# Patient Record
Sex: MALE | Race: WHITE | Employment: OTHER | URBAN - METROPOLITAN AREA
[De-identification: names, ages, dates, MRNs, and addresses within clinical notes are randomized per-mention and may not be internally consistent; named-entity substitution may affect disease eponyms.]

---

## 2017-03-26 ENCOUNTER — HOSPITAL ENCOUNTER (OUTPATIENT)
Dept: MRI IMAGING | Facility: HOSPITAL | Age: 77
Discharge: HOME | End: 2017-03-26
Attending: INTERNAL MEDICINE
Payer: MEDICARE

## 2017-03-26 DIAGNOSIS — M54.50 LUMBAR PAIN: ICD-10-CM

## 2017-03-26 PROCEDURE — 72148 MRI LUMBAR SPINE W/O DYE: CPT

## 2020-02-24 ENCOUNTER — LABORATORY ENCOUNTER (OUTPATIENT)
Dept: LAB | Facility: HOSPITAL | Age: 80
End: 2020-02-24
Attending: ORTHOPAEDIC SURGERY
Payer: MEDICARE

## 2020-02-24 ENCOUNTER — HOSPITAL ENCOUNTER (OUTPATIENT)
Dept: PHYSICAL THERAPY | Facility: HOSPITAL | Age: 80
Discharge: HOME | End: 2020-02-24
Attending: ORTHOPAEDIC SURGERY
Payer: MEDICARE

## 2020-02-24 DIAGNOSIS — M16.11 PRIMARY OSTEOARTHRITIS OF RIGHT HIP: ICD-10-CM

## 2020-02-24 LAB
ANTIBODY SCREEN: NEGATIVE
RH BLOOD TYPE: POSITIVE

## 2020-02-24 PROCEDURE — 86900 BLOOD TYPING SEROLOGIC ABO: CPT

## 2020-02-24 PROCEDURE — 86850 RBC ANTIBODY SCREEN: CPT

## 2020-02-24 PROCEDURE — 87081 CULTURE SCREEN ONLY: CPT

## 2020-02-24 PROCEDURE — 86901 BLOOD TYPING SEROLOGIC RH(D): CPT

## 2020-03-10 ENCOUNTER — ANESTHESIA EVENT (OUTPATIENT)
Dept: SURGERY | Facility: HOSPITAL | Age: 80
DRG: 470 | End: 2020-03-10
Payer: MEDICARE

## 2020-03-10 NOTE — H&P
Amrita Morelos Patient Status:  Surgery Admit - Inpt    1940 MRN [id number]   [facility] SURGERY Attending Berhane Jorgensen MD   Hosp Day # 0 PCP Joanna Kirkpatrick MD     Date of Admission List:     Congenital spondylolisthesis     Spinal stenosis, lumbar region, without neurogenic claudication     Osteoarthritis of left hip     At risk for falling     Primary osteoarthritis of left hip             GLOBAL EXP 3-9-15 / LEFT HIP / Ese Slade / [place]

## 2020-03-11 ENCOUNTER — HOSPITAL ENCOUNTER (INPATIENT)
Facility: HOSPITAL | Age: 80
LOS: 1 days | Discharge: HOME HEALTH | DRG: 470 | End: 2020-03-12
Attending: ORTHOPAEDIC SURGERY | Admitting: ORTHOPAEDIC SURGERY
Payer: MEDICARE

## 2020-03-11 ENCOUNTER — APPOINTMENT (OUTPATIENT)
Dept: GENERAL RADIOLOGY | Facility: HOSPITAL | Age: 80
DRG: 470 | End: 2020-03-11
Attending: ORTHOPAEDIC SURGERY
Payer: MEDICARE

## 2020-03-11 ENCOUNTER — ANESTHESIA (OUTPATIENT)
Dept: SURGERY | Facility: HOSPITAL | Age: 80
DRG: 470 | End: 2020-03-11
Payer: MEDICARE

## 2020-03-11 DIAGNOSIS — M16.11 PRIMARY OSTEOARTHRITIS OF RIGHT HIP: Primary | ICD-10-CM

## 2020-03-11 PROCEDURE — 3E0T3BZ INTRODUCTION OF ANESTHETIC AGENT INTO PERIPHERAL NERVES AND PLEXI, PERCUTANEOUS APPROACH: ICD-10-PCS | Performed by: ANESTHESIOLOGY

## 2020-03-11 PROCEDURE — 88342 IMHCHEM/IMCYTCHM 1ST ANTB: CPT | Performed by: ORTHOPAEDIC SURGERY

## 2020-03-11 PROCEDURE — 73501 X-RAY EXAM HIP UNI 1 VIEW: CPT | Performed by: ORTHOPAEDIC SURGERY

## 2020-03-11 PROCEDURE — 88311 DECALCIFY TISSUE: CPT | Performed by: ORTHOPAEDIC SURGERY

## 2020-03-11 PROCEDURE — 97116 GAIT TRAINING THERAPY: CPT

## 2020-03-11 PROCEDURE — 0SR90JA REPLACEMENT OF RIGHT HIP JOINT WITH SYNTHETIC SUBSTITUTE, UNCEMENTED, OPEN APPROACH: ICD-10-PCS | Performed by: ORTHOPAEDIC SURGERY

## 2020-03-11 PROCEDURE — 88341 IMHCHEM/IMCYTCHM EA ADD ANTB: CPT | Performed by: ORTHOPAEDIC SURGERY

## 2020-03-11 PROCEDURE — 97161 PT EVAL LOW COMPLEX 20 MIN: CPT

## 2020-03-11 PROCEDURE — 88304 TISSUE EXAM BY PATHOLOGIST: CPT | Performed by: ORTHOPAEDIC SURGERY

## 2020-03-11 DEVICE — CORAIL HIP SYSTEM CEMENTLESS FEMORAL STEM 12/14 AMT 135 DEGREES KS SIZE 11 HA COATED STANDARD NO COLLAR
Type: IMPLANTABLE DEVICE | Site: HIP | Status: FUNCTIONAL
Brand: CORAIL

## 2020-03-11 DEVICE — PINNACLE CANCELLOUS BONE SCREW 6.5MM X 25MM
Type: IMPLANTABLE DEVICE | Site: HIP | Status: FUNCTIONAL
Brand: PINNACLE

## 2020-03-11 DEVICE — PINNACLE GRIPTION ACETABULAR SHELL SECTOR 58MM OD
Type: IMPLANTABLE DEVICE | Site: HIP | Status: FUNCTIONAL
Brand: PINNACLE GRIPTION

## 2020-03-11 DEVICE — PINNACLE HIP SOLUTIONS ALTRX POLYETHYLENE ACETABULAR LINER NEUTRAL 36MM ID 58MM OD
Type: IMPLANTABLE DEVICE | Site: HIP | Status: FUNCTIONAL
Brand: PINNACLE ALTRX

## 2020-03-11 DEVICE — BIOLOX DELTA CERAMIC FEMORAL HEAD +1.5 36MM DIA 12/14 TAPER
Type: IMPLANTABLE DEVICE | Site: HIP | Status: FUNCTIONAL
Brand: BIOLOX DELTA

## 2020-03-11 DEVICE — APEX HOLE ELIMINATOR - PS
Type: IMPLANTABLE DEVICE | Site: HIP | Status: FUNCTIONAL
Brand: APEX

## 2020-03-11 RX ORDER — DIPHENHYDRAMINE HYDROCHLORIDE 50 MG/ML
12.5 INJECTION INTRAMUSCULAR; INTRAVENOUS EVERY 4 HOURS PRN
Status: DISCONTINUED | OUTPATIENT
Start: 2020-03-11 | End: 2020-03-12

## 2020-03-11 RX ORDER — ONDANSETRON 2 MG/ML
4 INJECTION INTRAMUSCULAR; INTRAVENOUS EVERY 4 HOURS PRN
Status: DISCONTINUED | OUTPATIENT
Start: 2020-03-11 | End: 2020-03-12

## 2020-03-11 RX ORDER — KETAMINE HYDROCHLORIDE 50 MG/ML
INJECTION, SOLUTION, CONCENTRATE INTRAMUSCULAR; INTRAVENOUS AS NEEDED
Status: DISCONTINUED | OUTPATIENT
Start: 2020-03-11 | End: 2020-03-11 | Stop reason: SURG

## 2020-03-11 RX ORDER — DOCUSATE SODIUM 100 MG/1
100 CAPSULE, LIQUID FILLED ORAL 2 TIMES DAILY
Status: DISCONTINUED | OUTPATIENT
Start: 2020-03-11 | End: 2020-03-12

## 2020-03-11 RX ORDER — ZOLPIDEM TARTRATE 5 MG/1
5 TABLET ORAL NIGHTLY PRN
Status: DISCONTINUED | OUTPATIENT
Start: 2020-03-11 | End: 2020-03-12

## 2020-03-11 RX ORDER — METOCLOPRAMIDE HYDROCHLORIDE 5 MG/ML
10 INJECTION INTRAMUSCULAR; INTRAVENOUS EVERY 6 HOURS PRN
Status: DISCONTINUED | OUTPATIENT
Start: 2020-03-11 | End: 2020-03-12

## 2020-03-11 RX ORDER — TAMSULOSIN HYDROCHLORIDE 0.4 MG/1
0.4 CAPSULE ORAL DAILY
COMMUNITY

## 2020-03-11 RX ORDER — ACETAMINOPHEN 325 MG/1
TABLET ORAL
Status: COMPLETED
Start: 2020-03-11 | End: 2020-03-11

## 2020-03-11 RX ORDER — PROCHLORPERAZINE EDISYLATE 5 MG/ML
10 INJECTION INTRAMUSCULAR; INTRAVENOUS EVERY 6 HOURS PRN
Status: DISCONTINUED | OUTPATIENT
Start: 2020-03-11 | End: 2020-03-12

## 2020-03-11 RX ORDER — BISACODYL 10 MG
10 SUPPOSITORY, RECTAL RECTAL
Status: DISCONTINUED | OUTPATIENT
Start: 2020-03-11 | End: 2020-03-12

## 2020-03-11 RX ORDER — CEFAZOLIN SODIUM/WATER 2 G/20 ML
2 SYRINGE (ML) INTRAVENOUS ONCE
Status: COMPLETED | OUTPATIENT
Start: 2020-03-11 | End: 2020-03-11

## 2020-03-11 RX ORDER — ACETAMINOPHEN 325 MG/1
650 TABLET ORAL 4 TIMES DAILY
Status: DISCONTINUED | OUTPATIENT
Start: 2020-03-11 | End: 2020-03-12

## 2020-03-11 RX ORDER — SODIUM CHLORIDE, SODIUM LACTATE, POTASSIUM CHLORIDE, CALCIUM CHLORIDE 600; 310; 30; 20 MG/100ML; MG/100ML; MG/100ML; MG/100ML
INJECTION, SOLUTION INTRAVENOUS CONTINUOUS
Status: DISCONTINUED | OUTPATIENT
Start: 2020-03-11 | End: 2020-03-12

## 2020-03-11 RX ORDER — ONDANSETRON 2 MG/ML
4 INJECTION INTRAMUSCULAR; INTRAVENOUS AS NEEDED
Status: DISCONTINUED | OUTPATIENT
Start: 2020-03-11 | End: 2020-03-11 | Stop reason: HOSPADM

## 2020-03-11 RX ORDER — SENNOSIDES 8.6 MG
17.2 TABLET ORAL NIGHTLY
Status: DISCONTINUED | OUTPATIENT
Start: 2020-03-11 | End: 2020-03-12

## 2020-03-11 RX ORDER — HYDROMORPHONE HYDROCHLORIDE 1 MG/ML
0.8 INJECTION, SOLUTION INTRAMUSCULAR; INTRAVENOUS; SUBCUTANEOUS EVERY 2 HOUR PRN
Status: DISCONTINUED | OUTPATIENT
Start: 2020-03-11 | End: 2020-03-12

## 2020-03-11 RX ORDER — CEFAZOLIN SODIUM/WATER 2 G/20 ML
2 SYRINGE (ML) INTRAVENOUS EVERY 8 HOURS
Status: COMPLETED | OUTPATIENT
Start: 2020-03-11 | End: 2020-03-11

## 2020-03-11 RX ORDER — TIZANIDINE 2 MG/1
2 TABLET ORAL 3 TIMES DAILY PRN
Status: DISCONTINUED | OUTPATIENT
Start: 2020-03-11 | End: 2020-03-12

## 2020-03-11 RX ORDER — OXYCODONE HYDROCHLORIDE 10 MG/1
10 TABLET ORAL EVERY 4 HOURS PRN
Status: DISCONTINUED | OUTPATIENT
Start: 2020-03-11 | End: 2020-03-12

## 2020-03-11 RX ORDER — OXYCODONE HYDROCHLORIDE 5 MG/1
5 TABLET ORAL EVERY 4 HOURS PRN
Status: DISCONTINUED | OUTPATIENT
Start: 2020-03-11 | End: 2020-03-12

## 2020-03-11 RX ORDER — SODIUM PHOSPHATE, DIBASIC AND SODIUM PHOSPHATE, MONOBASIC 7; 19 G/133ML; G/133ML
1 ENEMA RECTAL ONCE AS NEEDED
Status: DISCONTINUED | OUTPATIENT
Start: 2020-03-11 | End: 2020-03-12

## 2020-03-11 RX ORDER — DIPHENHYDRAMINE HCL 25 MG
25 CAPSULE ORAL EVERY 4 HOURS PRN
Status: DISCONTINUED | OUTPATIENT
Start: 2020-03-11 | End: 2020-03-12

## 2020-03-11 RX ORDER — TAMSULOSIN HYDROCHLORIDE 0.4 MG/1
0.4 CAPSULE ORAL DAILY
Status: DISCONTINUED | OUTPATIENT
Start: 2020-03-12 | End: 2020-03-12

## 2020-03-11 RX ORDER — KETOROLAC TROMETHAMINE 15 MG/ML
15 INJECTION, SOLUTION INTRAMUSCULAR; INTRAVENOUS EVERY 6 HOURS
Status: COMPLETED | OUTPATIENT
Start: 2020-03-11 | End: 2020-03-12

## 2020-03-11 RX ORDER — CEFAZOLIN SODIUM/WATER 2 G/20 ML
SYRINGE (ML) INTRAVENOUS
Status: DISPENSED
Start: 2020-03-11 | End: 2020-03-11

## 2020-03-11 RX ORDER — ACETAMINOPHEN 500 MG
1000 TABLET ORAL ONCE
Status: DISCONTINUED | OUTPATIENT
Start: 2020-03-11 | End: 2020-03-11

## 2020-03-11 RX ORDER — MIDAZOLAM HYDROCHLORIDE 1 MG/ML
INJECTION INTRAMUSCULAR; INTRAVENOUS AS NEEDED
Status: DISCONTINUED | OUTPATIENT
Start: 2020-03-11 | End: 2020-03-11 | Stop reason: SURG

## 2020-03-11 RX ORDER — HYDROMORPHONE HYDROCHLORIDE 1 MG/ML
0.4 INJECTION, SOLUTION INTRAMUSCULAR; INTRAVENOUS; SUBCUTANEOUS EVERY 5 MIN PRN
Status: DISCONTINUED | OUTPATIENT
Start: 2020-03-11 | End: 2020-03-11 | Stop reason: HOSPADM

## 2020-03-11 RX ORDER — METOCLOPRAMIDE HYDROCHLORIDE 5 MG/ML
10 INJECTION INTRAMUSCULAR; INTRAVENOUS AS NEEDED
Status: DISCONTINUED | OUTPATIENT
Start: 2020-03-11 | End: 2020-03-11 | Stop reason: HOSPADM

## 2020-03-11 RX ORDER — ACETAMINOPHEN 500 MG
500 TABLET ORAL ONCE AS NEEDED
Status: DISCONTINUED | OUTPATIENT
Start: 2020-03-11 | End: 2020-03-11 | Stop reason: HOSPADM

## 2020-03-11 RX ORDER — NALOXONE HYDROCHLORIDE 0.4 MG/ML
80 INJECTION, SOLUTION INTRAMUSCULAR; INTRAVENOUS; SUBCUTANEOUS AS NEEDED
Status: DISCONTINUED | OUTPATIENT
Start: 2020-03-11 | End: 2020-03-11 | Stop reason: HOSPADM

## 2020-03-11 RX ORDER — ONDANSETRON 2 MG/ML
INJECTION INTRAMUSCULAR; INTRAVENOUS AS NEEDED
Status: DISCONTINUED | OUTPATIENT
Start: 2020-03-11 | End: 2020-03-11 | Stop reason: SURG

## 2020-03-11 RX ORDER — DIPHENHYDRAMINE HYDROCHLORIDE 50 MG/ML
25 INJECTION INTRAMUSCULAR; INTRAVENOUS ONCE AS NEEDED
Status: ACTIVE | OUTPATIENT
Start: 2020-03-11 | End: 2020-03-11

## 2020-03-11 RX ORDER — HYDROMORPHONE HYDROCHLORIDE 1 MG/ML
0.4 INJECTION, SOLUTION INTRAMUSCULAR; INTRAVENOUS; SUBCUTANEOUS EVERY 2 HOUR PRN
Status: DISCONTINUED | OUTPATIENT
Start: 2020-03-11 | End: 2020-03-12

## 2020-03-11 RX ORDER — OXYCODONE HYDROCHLORIDE 5 MG/1
2.5 TABLET ORAL EVERY 4 HOURS PRN
Status: DISCONTINUED | OUTPATIENT
Start: 2020-03-11 | End: 2020-03-12

## 2020-03-11 RX ORDER — METOCLOPRAMIDE HYDROCHLORIDE 5 MG/ML
INJECTION INTRAMUSCULAR; INTRAVENOUS AS NEEDED
Status: DISCONTINUED | OUTPATIENT
Start: 2020-03-11 | End: 2020-03-11 | Stop reason: SURG

## 2020-03-11 RX ORDER — HYDROMORPHONE HYDROCHLORIDE 1 MG/ML
0.2 INJECTION, SOLUTION INTRAMUSCULAR; INTRAVENOUS; SUBCUTANEOUS EVERY 2 HOUR PRN
Status: DISCONTINUED | OUTPATIENT
Start: 2020-03-11 | End: 2020-03-12

## 2020-03-11 RX ORDER — SODIUM CHLORIDE, SODIUM LACTATE, POTASSIUM CHLORIDE, CALCIUM CHLORIDE 600; 310; 30; 20 MG/100ML; MG/100ML; MG/100ML; MG/100ML
INJECTION, SOLUTION INTRAVENOUS CONTINUOUS
Status: DISCONTINUED | OUTPATIENT
Start: 2020-03-11 | End: 2020-03-11 | Stop reason: HOSPADM

## 2020-03-11 RX ORDER — ASPIRIN 325 MG
325 TABLET ORAL 2 TIMES DAILY
Status: DISCONTINUED | OUTPATIENT
Start: 2020-03-11 | End: 2020-03-12

## 2020-03-11 RX ORDER — POLYETHYLENE GLYCOL 3350 17 G/17G
17 POWDER, FOR SOLUTION ORAL DAILY PRN
Status: DISCONTINUED | OUTPATIENT
Start: 2020-03-11 | End: 2020-03-12

## 2020-03-11 RX ORDER — HYDROCODONE BITARTRATE AND ACETAMINOPHEN 10; 325 MG/1; MG/1
1-2 TABLET ORAL EVERY 4 HOURS PRN
Qty: 60 TABLET | Refills: 0 | Status: SHIPPED | OUTPATIENT
Start: 2020-03-11

## 2020-03-11 RX ADMIN — METOCLOPRAMIDE HYDROCHLORIDE 10 MG: 5 INJECTION INTRAMUSCULAR; INTRAVENOUS at 07:39:00

## 2020-03-11 RX ADMIN — ONDANSETRON 4 MG: 2 INJECTION INTRAMUSCULAR; INTRAVENOUS at 07:38:00

## 2020-03-11 RX ADMIN — MIDAZOLAM HYDROCHLORIDE 2 MG: 1 INJECTION INTRAMUSCULAR; INTRAVENOUS at 07:02:00

## 2020-03-11 RX ADMIN — KETAMINE HYDROCHLORIDE 10 MG: 50 INJECTION, SOLUTION, CONCENTRATE INTRAMUSCULAR; INTRAVENOUS at 07:08:00

## 2020-03-11 RX ADMIN — CEFAZOLIN SODIUM/WATER 2 G: 2 G/20 ML SYRINGE (ML) INTRAVENOUS at 07:22:00

## 2020-03-11 NOTE — OPERATIVE REPORT
DATE OF PROCEDURE:  3/11/2020  PREOPERATIVE DIAGNOSIS: Severe osteoarthritis right hip. POSTOPERATIVE DIAGNOSIS: Severe osteoarthritis right hip. PROCEDURE PERFORMED: Non-cemented right total hip arthroplasty. SURGEON:  SONA Spencer ASS supplemental fixation. An apical hole eliminator was used. . A neutral liner to fit the acetabulum and to accept a 36 mm femoral head was impacted into place. Attention was turned to the femur.  A cylinder osteotome was used to clean out the greater trochan

## 2020-03-11 NOTE — ANESTHESIA PREPROCEDURE EVALUATION
PRE-OP EVALUATION    Patient Name: Katelynn Penaloza Tamy    Pre-op Diagnosis: Primary osteoarthritis of right hip [M16.11]    Procedure(s):  RIGHT TOTAL HIP ARTHROPLASTY     Surgeon(s) and Role:     Nerissa Perez MD - Primary    Pre-op vitals review neuromuscular disease                   Past Surgical History:   Procedure Laterality Date   • HIP TOTAL REPLACEMENT Left 12/9/2015    Performed by Berhane Jorgensen MD at [address]   • TOTAL HIP REPLACEMENT Left 12/9/15     Social History    Tobacco Use

## 2020-03-11 NOTE — INTERVAL H&P NOTE
Pre-op Diagnosis: Primary osteoarthritis of right hip [M16.11]    The above referenced H&P was reviewed by Landy Wynne MD on 3/11/2020, the patient was examined and no significant changes have occurred in the patient's condition since the H&P was perf

## 2020-03-11 NOTE — PLAN OF CARE
Pt arrived from PACU on bed. VSS. Rates pain 1/10, declines pain meds. C/O slight nausea, declines medication, ginger ale provided. Oriented to room and call light system. Educated on fall precautions. Paged Dr. Annie Macdonald for new consult. Will monitor.

## 2020-03-11 NOTE — ANESTHESIA PROCEDURE NOTES
[facility]  Performed by: Curtis Santiago DO  Authorized by: Curtis Santiago DO       General Information and Staff    Start Time:  3/11/2020 7:08 AM  End Time:  3/11/2020 7:10 AM  Anesthesiologist:  Curtis Santiago DO  Performed by:   Ba

## 2020-03-11 NOTE — PLAN OF CARE
Post op plan of care d/w patient and spouse at bedside. Goals discussed. Safety precautions discussed. I.S instructed.    Problem: Patient/Family Goals  Goal: Patient/Family Long Term Goal  Description  Patient's Long Term Goal: \"Be able to travel more and injury  Description  INTERVENTIONS:  - Assess pt frequently for physical needs  - Identify cognitive and physical deficits and behaviors that affect risk of falls.   - [place] fall precautions as indicated by assessment.  - Educate pt/family on patient sa

## 2020-03-11 NOTE — ANESTHESIA PROCEDURE NOTES
Spinal Block  Performed by: Tali Hopper DO  Authorized by: Tali Hopper DO       General Information and Staff    Start Time:  3/11/2020 7:05 AM  End Time:  3/11/2020 7:06 AM  Anesthesiologist:  Tali Hopper DO  Performed by:   Anesthe

## 2020-03-11 NOTE — ANESTHESIA POSTPROCEDURE EVALUATION
[address] [facility] Patient Status:  Surgery Admit - Inpt   Age/Gender 79year old male MRN [id number]   Location [facility] Attending Barrett Damon MD   Hosp Day # 0 PCP MD Amando Romero

## 2020-03-11 NOTE — CM/SW NOTE
PT recommending HH/PT. Paged [address] care with referral.  Liaison will meet with the patient/familyto provide choice, explanation of services, and financial disclosure.      HOME SITUATION  Type of Home: House   Home Layout: Two level  Stair

## 2020-03-11 NOTE — CONSULTS
[facility] Hospitalist Initial Consult       Reason for consult: Medical Management sp R SHAZIA      History of Present Illness: Patient is a 79year old male with PMH sig for back pain, who presents sp SHAZIA.    They tolerated the procedure well without any immediate Neck: Supple, symmetrical, trachea midline, no cervical or supraclavicular lymph adenopathy, thyroid: no enlargment/tenderness/nodules appreciated   Lungs:   Clear to auscultation bilaterally. Normal effort   Chest wall:  No tenderness or deformity.    He

## 2020-03-11 NOTE — PHYSICAL THERAPY NOTE
PHYSICAL THERAPY HIP EVALUATION - INPATIENT     Room Number: 365/365-A  Evaluation Date: 3/11/2020  Type of Evaluation: Initial  Physician Order: PT Eval and Treat    Presenting Problem: s/p right SHAZIA 3/11/2020  Reason for Therapy: Mobility Dysfunction and of need for assistance  --pt is impulsive, having difficulty staying on task this session, needing frequent redirection, pt tending to joke with staff      RANGE OF MOTION AND STRENGTH ASSESSMENT  Upper extremity ROM and strength are within functional limi with good quality. Pt transferred supine to sit with supervision only, good sitting balance, no dizziness, sitting bp 148/72. Pt provided verbal instruction and demonstration of rw use, stood to rw with min a.   Pt able to perform right tke, marching in as functional limitations in independent bed mobility, transfers, and gait. The patient is below baseline and would benefit from skilled inpatient PT to address the above deficits to assist patient in returning to prior to level of function.   DISCHARGE RE

## 2020-03-11 NOTE — HOME CARE LIAISON
MET WITH PTNT AND OFFERED CHOICE  OF AGENCIES. PTNT AGREEABLE TO [facility]. MET WITH PTNT TO DISCUSS HOME HEALTH SERVICES AND COVERAGE CRITERIA. PTNT AGREEABLE TO [facility]. PTNT GIVEN RESIDENTIAL BROCHURE.  RESIDENTIAL WITH PROVIDE SN/PT ON DISC

## 2020-03-12 VITALS
HEIGHT: 71 IN | DIASTOLIC BLOOD PRESSURE: 81 MMHG | RESPIRATION RATE: 18 BRPM | SYSTOLIC BLOOD PRESSURE: 165 MMHG | HEART RATE: 77 BPM | WEIGHT: 200.63 LBS | BODY MASS INDEX: 28.09 KG/M2 | OXYGEN SATURATION: 98 % | TEMPERATURE: 98 F

## 2020-03-12 LAB
DEPRECATED RDW RBC AUTO: 43 FL (ref 35.1–46.3)
ERYTHROCYTE [DISTWIDTH] IN BLOOD BY AUTOMATED COUNT: 12.5 % (ref 11–15)
HCT VFR BLD AUTO: 38.8 % (ref 39–53)
HGB BLD-MCNC: 12.9 G/DL (ref 13–17.5)
MCH RBC QN AUTO: 31.3 PG (ref 26–34)
MCHC RBC AUTO-ENTMCNC: 33.2 G/DL (ref 31–37)
MCV RBC AUTO: 94.2 FL (ref 80–100)
PLATELET # BLD AUTO: 161 10(3)UL (ref 150–450)
RBC # BLD AUTO: 4.12 X10(6)UL (ref 3.8–5.8)
WBC # BLD AUTO: 9 X10(3) UL (ref 4–11)

## 2020-03-12 PROCEDURE — 97150 GROUP THERAPEUTIC PROCEDURES: CPT

## 2020-03-12 PROCEDURE — 97116 GAIT TRAINING THERAPY: CPT

## 2020-03-12 PROCEDURE — 97165 OT EVAL LOW COMPLEX 30 MIN: CPT

## 2020-03-12 PROCEDURE — 97535 SELF CARE MNGMENT TRAINING: CPT

## 2020-03-12 PROCEDURE — 85027 COMPLETE CBC AUTOMATED: CPT | Performed by: ORTHOPAEDIC SURGERY

## 2020-03-12 RX ORDER — POLYETHYLENE GLYCOL 3350 17 G/17G
17 POWDER, FOR SOLUTION ORAL DAILY PRN
Qty: 10 EACH | Refills: 0 | Status: SHIPPED | COMMUNITY
Start: 2020-03-12

## 2020-03-12 RX ORDER — PSEUDOEPHEDRINE HCL 30 MG
TABLET ORAL
Qty: 60 CAPSULE | Refills: 0 | Status: SHIPPED | COMMUNITY
Start: 2020-03-12

## 2020-03-12 RX ORDER — ACETAMINOPHEN 325 MG/1
650 TABLET ORAL EVERY 4 HOURS PRN
Status: DISCONTINUED | OUTPATIENT
Start: 2020-03-12 | End: 2020-03-12

## 2020-03-12 RX ORDER — ACETAMINOPHEN 500 MG
500 TABLET ORAL EVERY 4 HOURS PRN
Qty: 60 TABLET | Refills: 0 | Status: SHIPPED | COMMUNITY
Start: 2020-03-12

## 2020-03-12 RX ORDER — HYDROCODONE BITARTRATE AND ACETAMINOPHEN 5; 325 MG/1; MG/1
1 TABLET ORAL EVERY 4 HOURS PRN
Status: DISCONTINUED | OUTPATIENT
Start: 2020-03-12 | End: 2020-03-12

## 2020-03-12 NOTE — OCCUPATIONAL THERAPY NOTE
OCCUPATIONAL THERAPY QUICK EVALUATION - INPATIENT    Room Number: 365/365-A  Evaluation Date: 3/12/2020     Type of Evaluation: Quick Eval  Presenting Problem: s/p R SHAZIA 3/11/20    Physician Order: IP Consult to Occupational Therapy  Reason for Therapy:  A NEUROLOGICAL FINDINGS  Neurological Findings: None                ACTIVITY TOLERANCE                         O2 SATURATIONS                ACTIVITIES OF DAILY LIVING ASSESSMENT  AM-PAC ‘6-Clicks’ Inpatient Daily Activity Short Form   How much help from present    ASSESSMENT   Patient is a 79year old male admitted 3/11/2020 for R SHAZIA. Patient seen this am for OT evauation.  In this OT eval patient performed all ADL tasks, functional transfers and dynamic reaching safely, without loss of balance, and at [facility]

## 2020-03-12 NOTE — PHYSICAL THERAPY NOTE
PHYSICAL THERAPY HIP TREATMENT NOTE - INPATIENT      Room Number: 365/365-A     Session: 1&2  Number of Visits to Meet Established Goals: 3    Presenting Problem: s/p right SHAZIA 3/11/2020    History related to current admission: Pt admitted 3/11/2020 for vinicio (including a wheelchair)?: A Little   -   Need to walk in hospital room?: A Little   -   Climbing 3-5 steps with a railing?: A Little       AM-PAC Score:  Raw Score: 19   Approx Degree of Impairment: 41.77%   Standardized Score (AM-PAC Scale): 45.44   CMS End of Session: Up in chair;Needs met;Call light within reach;RN aware of session/findings; All patient questions and concerns addressed    ASSESSMENT   Pt continues to present with impaired strength and decreased ROM RLE , decreased endurance and impaired

## 2020-03-12 NOTE — CM/SW NOTE
03/12/20 1545   Discharge disposition   Expected discharge disposition Home-Health   Name of Facillity/Home Care/Hospice Residential   Discharge transportation Private car

## 2020-03-12 NOTE — PLAN OF CARE
Alert and oriented x4. VSS on RA. , IS encouraged and demonstrated. SCDs on/ASA. IVF. Tolerating diet, no N/V. Voiding freely. Last BM 3/10. Pain well managed with scheduled pain medication per ortho pain protocol. Aquacel to R hip, C/D/I. Ice PRN.  Ante

## 2020-03-12 NOTE — PROGRESS NOTES
[facility] Patient Status:  Inpatient    1940 MRN [id number]   [facility] 3SW-A Attending Erasmo Griffin MD   [address] Day # 1 PCP Yvan Daugherty MD     Subjective:  RightTotal Hip Arthroplasty  Systemic

## 2020-03-12 NOTE — PROGRESS NOTES
[facility] Hospitalist Progress Note      [facility] Patient Status:  Inpatient    1940 MRN [id number]   [facility] 3SW-A Attending Christophe Jhaveri MD   Hosp Day # 1 PCP Jody Franco MD     CC: follow up    SUBJECTIVE:  No HCl       Microbiology:    No results found for this visit on 03/11/20. Assessment/Plan:     sp R SHAZIA  - Plan per ortho. Continue PT/OT. Pain is currently controlled.   Asa 325 bid for dvt proph per ortho recs     Acute on chronic pain  - apprec maciej

## 2020-03-12 NOTE — PLAN OF CARE
Pt a&O. On room air. Encouraged use of incentive spirometer and ankle pump exercises every hour while awake. Scds to BLE. Tolerating regular diet with good appetite. Had BM this AM. Voiding w/o difficulty. Pain managed with oral tylenol.  Pt reminded to \"c

## 2020-03-12 NOTE — PROGRESS NOTES
Wife at bedside. POD #1 Joint newsletter given to patient. Reviewed indications, side effects of pain medication/narcotics and constipation prevention.  Stressed importance of increased fluids/roughage in diet, continued use of stool softeners along with lax

## 2020-03-13 NOTE — PAYOR COMM NOTE
--------------  DISCHARGE REVIEW    PayorAnalilia Lindquist [place] [facility]  Subscriber #:  [id number]  Authorization Number: [id number]    Admit date: 3/11/20  Admit time:  8763  Discharge Date: 3/12/2020  5:21 PM     Admitting Physician: Barrett Damon MD  Attending Physi

## 2020-03-20 ENCOUNTER — OFFICE VISIT (OUTPATIENT)
Dept: HEMATOLOGY/ONCOLOGY | Facility: HOSPITAL | Age: 80
End: 2020-03-20
Attending: INTERNAL MEDICINE
Payer: MEDICARE

## 2020-03-20 VITALS
HEART RATE: 103 BPM | RESPIRATION RATE: 18 BRPM | TEMPERATURE: 98 F | DIASTOLIC BLOOD PRESSURE: 76 MMHG | HEIGHT: 69.06 IN | SYSTOLIC BLOOD PRESSURE: 138 MMHG | WEIGHT: 199 LBS | BODY MASS INDEX: 29.47 KG/M2 | OXYGEN SATURATION: 96 %

## 2020-03-20 DIAGNOSIS — C85.10 LOW GRADE B-CELL LYMPHOMA (HCC): Primary | ICD-10-CM

## 2020-03-20 LAB
ALBUMIN SERPL-MCNC: 3.2 G/DL (ref 3.4–5)
ALBUMIN/GLOB SERPL: 0.7 {RATIO} (ref 1–2)
ALP LIVER SERPL-CCNC: 76 U/L (ref 45–117)
ALT SERPL-CCNC: 60 U/L (ref 16–61)
ANION GAP SERPL CALC-SCNC: 6 MMOL/L (ref 0–18)
AST SERPL-CCNC: 40 U/L (ref 15–37)
BASOPHILS # BLD AUTO: 0.05 X10(3) UL (ref 0–0.2)
BASOPHILS NFR BLD AUTO: 0.5 %
BILIRUB SERPL-MCNC: 0.8 MG/DL (ref 0.1–2)
BUN BLD-MCNC: 23 MG/DL (ref 7–18)
BUN/CREAT SERPL: 20.7 (ref 10–20)
CALCIUM BLD-MCNC: 9.5 MG/DL (ref 8.5–10.1)
CHLORIDE SERPL-SCNC: 106 MMOL/L (ref 98–112)
CO2 SERPL-SCNC: 26 MMOL/L (ref 21–32)
CREAT BLD-MCNC: 1.11 MG/DL (ref 0.7–1.3)
DEPRECATED RDW RBC AUTO: 44.6 FL (ref 35.1–46.3)
EOSINOPHIL # BLD AUTO: 0.11 X10(3) UL (ref 0–0.7)
EOSINOPHIL NFR BLD AUTO: 1.1 %
ERYTHROCYTE [DISTWIDTH] IN BLOOD BY AUTOMATED COUNT: 12.6 % (ref 11–15)
GLOBULIN PLAS-MCNC: 4.7 G/DL (ref 2.8–4.4)
GLUCOSE BLD-MCNC: 133 MG/DL (ref 70–99)
HCT VFR BLD AUTO: 41.3 % (ref 39–53)
HGB BLD-MCNC: 13.5 G/DL (ref 13–17.5)
IMM GRANULOCYTES # BLD AUTO: 0.06 X10(3) UL (ref 0–1)
IMM GRANULOCYTES NFR BLD: 0.6 %
LDH SERPL L TO P-CCNC: 266 U/L
LYMPHOCYTES # BLD AUTO: 1.18 X10(3) UL (ref 1–4)
LYMPHOCYTES NFR BLD AUTO: 12.1 %
M PROTEIN MFR SERPL ELPH: 7.9 G/DL (ref 6.4–8.2)
MCH RBC QN AUTO: 31.5 PG (ref 26–34)
MCHC RBC AUTO-ENTMCNC: 32.7 G/DL (ref 31–37)
MCV RBC AUTO: 96.5 FL (ref 80–100)
MONOCYTES # BLD AUTO: 0.86 X10(3) UL (ref 0.1–1)
MONOCYTES NFR BLD AUTO: 8.8 %
NEUTROPHILS # BLD AUTO: 7.47 X10 (3) UL (ref 1.5–7.7)
NEUTROPHILS # BLD AUTO: 7.47 X10(3) UL (ref 1.5–7.7)
NEUTROPHILS NFR BLD AUTO: 76.9 %
OSMOLALITY SERPL CALC.SUM OF ELEC: 292 MOSM/KG (ref 275–295)
PATIENT FASTING Y/N/NP: NO
PLATELET # BLD AUTO: 300 10(3)UL (ref 150–450)
POTASSIUM SERPL-SCNC: 4.5 MMOL/L (ref 3.5–5.1)
RBC # BLD AUTO: 4.28 X10(6)UL (ref 3.8–5.8)
SODIUM SERPL-SCNC: 138 MMOL/L (ref 136–145)
WBC # BLD AUTO: 9.7 X10(3) UL (ref 4–11)

## 2020-03-20 PROCEDURE — 99205 OFFICE O/P NEW HI 60 MIN: CPT | Performed by: INTERNAL MEDICINE

## 2020-03-20 NOTE — PROGRESS NOTES
[facility] Note    Diagnosis: Low Grade B-Cell Lymphoma     Treatment History: N/A    Visit Diagnosis:  Low grade B-cell lymphoma ([facility] [address]  (primary encounter diagnosis)    History of Present Illness: 80M with He follows with a PMH tablets by mouth every 4 (four) hours as needed. , Disp: 60 tablet, Rfl: 0  aspirin 325 MG Oral Tab EC, Take 1 tablet (325 mg total) by mouth 2 (two) times daily. , Disp: 56 tablet, Rfl: 0  Cyanocobalamin (VITAMIN B 12 OR), Take 1 tablet by mouth daily.   , D Substance and Sexual Activity      Alcohol use:  Yes        Alcohol/week: 1.0 standard drinks        Types: 1 Glasses of wine per week        Frequency: 2-3 times a week        Drinks per session: 1 or 2        Binge frequency: Never        Comment: red win the infiltrate. CD20 and PAX5 demonstrate that the majority of the lymphocytes are B cells. CD3 and CD5 highlight small T lymphocytes. CD43 and Lef1 are positive within T cells. BCL2 is positive within B and T cells.   The lymphoid infiltrate is negative

## 2020-03-27 ENCOUNTER — OFFICE VISIT (OUTPATIENT)
Dept: PHYSICAL THERAPY | Age: 80
End: 2020-03-27
Attending: ORTHOPAEDIC SURGERY
Payer: MEDICARE

## 2020-03-27 DIAGNOSIS — Z96.641 HISTORY OF TOTAL RIGHT HIP REPLACEMENT: ICD-10-CM

## 2020-03-27 PROCEDURE — 97161 PT EVAL LOW COMPLEX 20 MIN: CPT

## 2020-03-27 PROCEDURE — 97110 THERAPEUTIC EXERCISES: CPT

## 2020-03-27 PROCEDURE — 97116 GAIT TRAINING THERAPY: CPT

## 2020-03-27 NOTE — PROGRESS NOTES
POST-OP HIP EVALUATION:   Referring Physician: Dr. Elisabet Chin  Diagnosis: 3/11/20  R SHAZIA    Date of Service: 3/27/2020     PATIENT SUMMARY   Lori Acuña is a 79year old male who presents to therapy today s/p R SHAZIA with anterolateral  approach .  C pathology, POC and HEP. Pt voiced understanding and performs HEP correctly without reported pain. Skilled Physical Therapy is medically necessary to address the above impairments and reach functional goals.      Precautions:  No flexion greater than 90 , n 30 min     Total Treatment Time: 60 min     Based on clinical rationale and outcome measures, this evaluation involved .   PLAN OF CARE:    Goals: (To be met in 10 visits)   · Pt will have improved hip AROM ER  to  45 deg  and ABD to 35  deg to be able to d Date____________________    Certification From: 7/57/5733  To:6/25/2020

## 2020-03-30 ENCOUNTER — OFFICE VISIT (OUTPATIENT)
Dept: PHYSICAL THERAPY | Age: 80
End: 2020-03-30
Attending: ORTHOPAEDIC SURGERY
Payer: MEDICARE

## 2020-03-30 PROCEDURE — 97116 GAIT TRAINING THERAPY: CPT

## 2020-03-30 PROCEDURE — 97110 THERAPEUTIC EXERCISES: CPT

## 2020-03-30 PROCEDURE — 97112 NEUROMUSCULAR REEDUCATION: CPT

## 2020-03-30 NOTE — PROGRESS NOTES
Dx:  R  SHAZIA  3/11   Insurance (Authorized # of Visits):  8        Authorizing Physician: Dr. Lionel Vo Next MD visit: none scheduled  Fall Risk: standard         Precautions: no ER, flex beyond 90, no adduction , no extension     ( anterolateral) KE x15 R/L       PROM R hip flex to 90 deg, R hip PROM R hip abd  x10 each        Manual gastroc stretches, metatarsal mobs         Manual OP into full ext,   - R PF mobs        Supine TKE x20   Heel slides x20        HEP:     Charges: gt, neuroreed, ex

## 2020-04-02 ENCOUNTER — OFFICE VISIT (OUTPATIENT)
Dept: PHYSICAL THERAPY | Age: 80
End: 2020-04-02
Attending: ORTHOPAEDIC SURGERY
Payer: MEDICARE

## 2020-04-02 PROCEDURE — 97110 THERAPEUTIC EXERCISES: CPT

## 2020-04-02 PROCEDURE — 97140 MANUAL THERAPY 1/> REGIONS: CPT

## 2020-04-02 PROCEDURE — 97116 GAIT TRAINING THERAPY: CPT

## 2020-04-02 NOTE — PROGRESS NOTES
Dx:  R  SHAZIA  3/11   Insurance (Authorized # of Visits):  8        Authorizing Physician: Dr. Robin Post Next MD visit: none scheduled  Fall Risk: standard         Precautions: no ER, flex beyond 90, no adduction , no extension     ( anterolateral) 5/ Date: Tx#: 6/   amb in PBs with 1 UE support: cues for heel strike, step length  Cont with amb 200' with cane with cues for step length, heel strike.        Standing: alt foot placement R/L on 6\" step x8 each ( difficult)  cont x10 reps R/L      R act

## 2020-04-06 ENCOUNTER — TELEPHONE (OUTPATIENT)
Dept: PHYSICAL THERAPY | Facility: HOSPITAL | Age: 80
End: 2020-04-06

## 2020-04-09 ENCOUNTER — OFFICE VISIT (OUTPATIENT)
Dept: PHYSICAL THERAPY | Age: 80
End: 2020-04-09
Attending: ORTHOPAEDIC SURGERY
Payer: MEDICARE

## 2020-04-09 PROCEDURE — 97110 THERAPEUTIC EXERCISES: CPT

## 2020-04-09 NOTE — PROGRESS NOTES
Dx:  R  SHAZIA  3/11   Insurance (Authorized # of Visits):  8        Authorizing Physician: Dr. Melanie Flores Next MD visit: none scheduled  Fall Risk: standard         Precautions: no ER, flex beyond 90, no adduction , no extension     ( anterolateral) lifted.      Goals: (To be met in 10 visits)   · Pt will have improved hip AROM ER  to  45 deg  and ABD to 35  deg to be able to don/doff shoes and perform car transfers without difficulty  · Pt will improve hip ABD and ER strength to 4*/5 to increase ease Table:  Hip control with pt on L side, 2 pillows between knees  -perform hip ER AROM to neutral, 1-2 second hold, with  \"clam\",  t/c's to maintain positioning for hip   X 10 3 sets      Standing :rhythmic stab with  A/p and M/L challenges.    - standing f

## 2020-04-13 ENCOUNTER — OFFICE VISIT (OUTPATIENT)
Dept: PHYSICAL THERAPY | Age: 80
End: 2020-04-13
Attending: ORTHOPAEDIC SURGERY
Payer: MEDICARE

## 2020-04-13 PROCEDURE — 97110 THERAPEUTIC EXERCISES: CPT

## 2020-04-13 NOTE — PROGRESS NOTES
Dx:  R  SHAZIA  3/11   Insurance (Authorized # of Visits):  8        Authorizing Physician: Dr. Mireya Carrillo Next MD visit: none scheduled  Fall Risk: standard         Precautions: no ER, flex beyond 90, no adduction , no extension (anterolateral) PT    Plan:Conttinue with PT, focusing on overall LE strengthening, hip strengthening as well as gait training and endurance exercise.   Date: 3/30/2020  TX#: 2/8 Date:  4/2/2020          TX#: 3/8 Date:  4/9             TX#: 4/8 Date: 4/13                 T second hold, with  \"clam\",  t/c's to maintain positioning for hip   X 10 3 sets      Standing :rhythmic stab with  A/p and M/L challenges. - standing forward reaches to progress dynamic standing balance.   Stand: R side next to wall, chair to L for supp

## 2020-04-16 ENCOUNTER — OFFICE VISIT (OUTPATIENT)
Dept: PHYSICAL THERAPY | Age: 80
End: 2020-04-16
Attending: ORTHOPAEDIC SURGERY
Payer: MEDICARE

## 2020-04-16 PROCEDURE — 97110 THERAPEUTIC EXERCISES: CPT

## 2020-04-16 NOTE — PROGRESS NOTES
Dx:  R  SHAZIA  3/11   Insurance (Authorized # of Visits):  8        Authorizing Physician: Dr. Pillo Barth Next MD visit: 5/7/2020  Fall Risk: standard         Precautions: no ER, flex beyond 90, no adduction , no extension (anterolateral)      No R of falls on uneven surfaces such as grass  · Pt will be able to squat to  light objects around the house with <3/10 hip pain   · Improve endurance to allow patient to allow patient to walk 4 blocks   · Pt will improve functional hip strength to repo reps  -Bridge Bilateral 10 reps  - Standing knee flexion R LE 10 reps   -Standing abduction 10 reps  - Mini squats 10 reps   - Airex Bilateral stance 30 seconds x3 reps   - Modified tandem stance R+L foot in front 30 seconds each  - Modified tandem one coty assessment of patient care.

## 2020-04-20 ENCOUNTER — OFFICE VISIT (OUTPATIENT)
Dept: PHYSICAL THERAPY | Age: 80
End: 2020-04-20
Attending: ORTHOPAEDIC SURGERY
Payer: MEDICARE

## 2020-04-20 PROCEDURE — 97110 THERAPEUTIC EXERCISES: CPT

## 2020-04-20 NOTE — PROGRESS NOTES
Dx:  R  SHAZIA  3/11   Insurance (Authorized # of Visits):  8        Authorizing Physician: Dr. August Estimable Next MD visit: 5/7/2020  Fall Risk: standard         Precautions: no ER, flex beyond 90, no adduction , no extension (anterolateral)      No R demonstrate improved SLS to >8 seconds MICHAEL to promote safety and decrease risk of falls on uneven surfaces such as grass  · Pt will be able to squat to  light objects around the house with <3/10 hip pain   · Improve endurance to allow patient to all placement R/L on 6\" step x8 each ( difficult)  cont x10 reps R/L Review hip precautions  In p bars  -simulate stair amb with 4 inch box step, no leading with R. Reviewed hip precautions        R active KE in 90/90 position  x10   - seated KE x15 R/L   Con hand, cues for erect posture, no shuffle gait, walked pt to entrance of building   (5 min)      HEP:   BID: Heel raises, standing knee flexion, glute sets, ankle pumps,  Sit to stand x10 quad sets, TKEs  All x20     Charges:  There ex 3       Total Timed Tr

## 2020-04-23 ENCOUNTER — OFFICE VISIT (OUTPATIENT)
Dept: PHYSICAL THERAPY | Age: 80
End: 2020-04-23
Attending: ORTHOPAEDIC SURGERY
Payer: MEDICARE

## 2020-04-23 PROCEDURE — 97110 THERAPEUTIC EXERCISES: CPT

## 2020-04-23 NOTE — PROGRESS NOTES
Dx:  R  SHAZIA  3/11   Insurance (Authorized # of Visits):  8        Authorizing Physician: Dr. Ashley Lam Next MD visit: 5/7/2020  Fall Risk: standard         Precautions: no ER, flex beyond 90, no adduction , no extension (anterolateral)     Has hx reciprocally with use of handrail  · Pt will be independent and compliant with comprehensive HEP to maintain progress achieved in PT    Plan:Continue with PT, focusing on gluteal,hip and overall leg strengthening.  Also progress balance and functional activ pressure, R 5x60 sec hold  -SLS R+L with 1 UE support 30 seconds x2 reps   -stair assessment     Standing: alt foot placement R/L on 6\" step x8 each ( difficult)  cont x10 reps R/L Review hip precautions  In p bars  -simulate stair amb with 4 inch box armani other side unable due to decreased R control with SLS.        Supine TKE x20   Heel slides x20  x20   x30  Gait: amb SPC L hand, cues for erect posture, no shuffle gait, walked pt to entrance of building   (5 min)       HEP:   Mini squats, Standing knee fle

## 2020-04-27 ENCOUNTER — OFFICE VISIT (OUTPATIENT)
Dept: PHYSICAL THERAPY | Age: 80
End: 2020-04-27
Attending: ORTHOPAEDIC SURGERY
Payer: MEDICARE

## 2020-04-27 PROCEDURE — 97110 THERAPEUTIC EXERCISES: CPT

## 2020-04-27 NOTE — PROGRESS NOTES
Dx:  R  SHAZIA  3/11   Insurance (Authorized # of Visits):  8        Authorizing Physician: Dr. Ofelia Campuzano Next MD visit: 5/7/2020  Fall Risk: standard         Precautions: no ER, flex beyond 90, no adduction , no extension (anterolateral)     Has hx progress hip and overall LE strengthening,     Date: 3/30/2020  TX#: 2/8 Date:  4/2/2020          TX#: 3/8 Date:  4/9             TX#: 4/8 Date: 4/13                 TX#: 5/8 Date: 4/16  Tx#: 6/8 Date: 4/20  Tx#: 7/10 Date: 4/23  Tx#: 8/10 Date: 4/27  Tx#: Standing knee flexion R+L LE 2x10 reps, R+L 10 reps with 1 UE support   -Bilateral stance green disc, 3x30 seconds   -R+L step ups on 2 inch step and airex 2x20 reps   - -Mini squats with UE support 3x10 reps, cues for glute activation and proper form  -Ha hold, with  \"clam\",  t/c's to maintain positioning for hip   X 10 3 sets         Standing :rhythmic stab with  A/p and M/L challenges. - standing forward reaches to progress dynamic standing balance.   Stand: R side next to wall, chair to L for support,

## 2020-04-29 ENCOUNTER — OFFICE VISIT (OUTPATIENT)
Dept: PHYSICAL THERAPY | Age: 80
End: 2020-04-29
Attending: ORTHOPAEDIC SURGERY
Payer: MEDICARE

## 2020-04-29 PROCEDURE — 97110 THERAPEUTIC EXERCISES: CPT

## 2020-04-29 NOTE — PROGRESS NOTES
Dx:  R  SHAZIA  3/11   Insurance (Authorized # of Visits):  8        Authorizing Physician: Dr. Alivia Quick Next MD visit: 5/7/2020  Fall Risk: standard         Precautions: no ER, flex beyond 90, no adduction , no extension (anterolateral)   Has hx o ability to ascend/descend 1 flight of stairs reciprocally with use of handrail MET 4/23/2020  · Pt will be independent and compliant with comprehensive HEP to maintain progress achieved in PT     Plan: Continue skilled Physical Therapy 1-2x/week or a total LE 2x10 reps   -Bilateral heel raises 2x10 reps   - Airex Bilateral stance 30 seconds x2 reps   - Modified tandem one foot on airex R+L 30 seconds each 2 reps   -Seated LAQ R+L 10 reps in, 2 second holds on the R  -Hamstring stretch, therapist applied pres reps  -Heel slides R LE 2x10 reps  - TKE R LE [id number]   -Glut sets Bilateral [id number]  - L sidelying clams 10 reps  -Bridge Bilateral 10 reps  - Standing knee flexion R LE 10 reps   -Standing abduction 10 reps  - Mini squats 10 reps   - Airex Bilateral st

## 2020-04-30 ENCOUNTER — OFFICE VISIT (OUTPATIENT)
Dept: HEMATOLOGY/ONCOLOGY | Facility: HOSPITAL | Age: 80
End: 2020-04-30
Attending: INTERNAL MEDICINE
Payer: MEDICARE

## 2020-04-30 VITALS
HEIGHT: 69.06 IN | SYSTOLIC BLOOD PRESSURE: 136 MMHG | HEART RATE: 88 BPM | DIASTOLIC BLOOD PRESSURE: 83 MMHG | WEIGHT: 191 LBS | TEMPERATURE: 97 F | BODY MASS INDEX: 28.29 KG/M2 | RESPIRATION RATE: 16 BRPM | OXYGEN SATURATION: 97 %

## 2020-04-30 DIAGNOSIS — C85.10 LOW GRADE B-CELL LYMPHOMA (HCC): Primary | ICD-10-CM

## 2020-04-30 PROCEDURE — 99213 OFFICE O/P EST LOW 20 MIN: CPT | Performed by: INTERNAL MEDICINE

## 2020-04-30 NOTE — PROGRESS NOTES
[facility] Hematology and Oncology Clinic Note    Diagnosis: Low Grade B-Cell Lymphoma     Treatment History: N/A    Visit Diagnosis:  Low grade B-cell lymphoma ([address]  (primary encounter diagnosis)     History of Present Illness: 80M with He follows with a PMH 0  acetaminophen 500 MG Oral Tab, Take 1 tablet (500 mg total) by mouth every 4 (four) hours as needed for Pain (mild pain). , Disp: 60 tablet, Rfl: 0  tamsulosin (FLOMAX) cap, Take 0.4 mg by mouth daily.   , Disp: , Rfl:   HYDROcodone-acetaminophen  M (04/30 1056)     General: NAD, AOX3, using walker   HEENT: clear op, mmm, no jvd, no scleral icterus  LN: no supraclavicular, infraclavicular, axillary or inguinal LAD  CV: RRR S1S2 no murmurs  Extremities: No edema   Lungs: CTAB, no increased work of steve peripheral blood may be of interest.       Dr. Samantha Vazquez has reviewed the case and concurs. Assessment and Plan:  Atypical Lymphoid Aggregate in Bone Marrow: This was incidentally noted on a R Hip SHAZIA on 3/11/20.  The aggregate is CD20+ and cells appear

## 2020-05-04 ENCOUNTER — APPOINTMENT (OUTPATIENT)
Dept: PHYSICAL THERAPY | Age: 80
End: 2020-05-04
Attending: ORTHOPAEDIC SURGERY
Payer: MEDICARE

## 2020-05-06 ENCOUNTER — OFFICE VISIT (OUTPATIENT)
Dept: PHYSICAL THERAPY | Age: 80
End: 2020-05-06
Attending: ORTHOPAEDIC SURGERY
Payer: MEDICARE

## 2020-05-06 PROCEDURE — 97110 THERAPEUTIC EXERCISES: CPT

## 2020-05-06 NOTE — PROGRESS NOTES
Dx:  R  SHAZIA  3/11   Insurance (Authorized # of Visits):  12 Auth  18 POC       Authorizing Physician: Dr. Jose Eagle MD visit: 5/7/2020  Fall Risk: standard         Precautions: no ER, flex beyond 90, no adduction , no extension (anterolateral with comprehensive HEP to maintain progress achieved in PT     Plan: Continue PT, focusing on balance and overall LE strengthening exercises     Date: 4/13                 TX#: 5/8 Date: 4/16  Tx#: 6/8 Date: 4/20  Tx#: 7/10 Date: 4/23  Tx#: 8/10 Date: 4/27 inch step and airex 2x20 reps   - -Mini squats with UE support 3x10 reps, cues for glute activation and proper form  -Hamstring stretch, therapist applied pressure, R+L 4x60 sec hold  --SLS R+L with 1 UE support 20 seconds x2 reps   --Seated LAQ R+L 2x10 r flexion R+L, hook lying pelvic tilts     Charges:  There ex 3       Total Timed Treatment:   45 min  Total Treatment Time : 39  Min  This treatment was provided by KETURAH Harrell under the direct and constant direction and supervision of a license

## 2020-05-13 ENCOUNTER — OFFICE VISIT (OUTPATIENT)
Dept: PHYSICAL THERAPY | Age: 80
End: 2020-05-13
Attending: ORTHOPAEDIC SURGERY
Payer: MEDICARE

## 2020-05-13 PROCEDURE — 97110 THERAPEUTIC EXERCISES: CPT

## 2020-05-13 NOTE — PROGRESS NOTES
Dx:  R  SHAZIA  3/11   Insurance (Authorized # of Visits):  12 Auth 18 POC       Authorizing Physician: Dr. Reina Eagle MD visit: 5/18/2020  Fall Risk: standard         Precautions: no ER, flex beyond 90, no adduction , no extension (anterolatera independent and compliant with comprehensive HEP to maintain progress achieved in PT     Plan: Continue PT, focusing on overall balance and strength exercises, as well as functional exercises      Date: 4/16  Tx#: 6/8 Date: 4/20  Tx#: 7/10 Date: 4/23  Tx#: step and airex 2x20 reps   - -Mini squats with UE support 3x10 reps, cues for glute activation and proper form  -Hamstring stretch, therapist applied pressure, R+L 4x60 sec hold  --SLS R+L with 1 UE support 20 seconds x2 reps   --Seated LAQ R+L 2x10 reps 1 Treatment:   45 min  Total Treatment Time : 39  Min  This treatment was provided by KETURAH Ventura under the direct and constant direction and supervision of a licensed therapist, who provided consultation regarding skilled judgements, treatment,

## 2020-05-20 ENCOUNTER — OFFICE VISIT (OUTPATIENT)
Dept: PHYSICAL THERAPY | Age: 80
End: 2020-05-20
Attending: ORTHOPAEDIC SURGERY
Payer: MEDICARE

## 2020-05-20 PROCEDURE — 97110 THERAPEUTIC EXERCISES: CPT

## 2020-05-20 NOTE — PROGRESS NOTES
Dx:  R  SHAZIA  3/11   Insurance (Authorized # of Visits):  No Auth Limit  18 POC       Authorizing Physician: Dr. Marita Mello Next MD visit: 5/18/2020  Fall Risk: standard         Precautions: no ER, flex beyond 90, no adduction , no extension (anna 10/10 Date: 5/6  Tx#: 11/18 Date: 5/13  Tx#: 12/18 Date: 5/20  Tx#: 13/18   Therex:  -Nu Step level 4, LE's reciprocal AROM x 5 min  -Tiltboard, bilateral forward/backward taps 2x20 reps  - Standing knee flexion R LE 2x10 reps   -Bilateral heel raises 2x10 min  -Hamstring stretch, therapist applied pressure, R+L 3x60 sec hold  - hook lying pelvic tilts 2x10 reps  -Seated LAQ R+L 3x10 reps 2.5 lb weight, 2 second holds on the R  -Tiltboard, bilateral forward/backward taps 2x20 reps  --R+L step ups on 2 inch s

## 2020-05-26 ENCOUNTER — HOSPITAL ENCOUNTER (OUTPATIENT)
Dept: NUCLEAR MEDICINE | Facility: HOSPITAL | Age: 80
Discharge: HOME | End: 2020-05-26
Attending: INTERNAL MEDICINE
Payer: MEDICARE

## 2020-05-26 DIAGNOSIS — C85.10 LOW GRADE B-CELL LYMPHOMA (HCC): ICD-10-CM

## 2020-05-26 PROCEDURE — 78815 PET IMAGE W/CT SKULL-THIGH: CPT | Performed by: INTERNAL MEDICINE

## 2020-05-26 PROCEDURE — 82962 GLUCOSE BLOOD TEST: CPT

## 2020-05-27 ENCOUNTER — APPOINTMENT (OUTPATIENT)
Dept: PHYSICAL THERAPY | Age: 80
End: 2020-05-27
Attending: ORTHOPAEDIC SURGERY
Payer: MEDICARE

## 2020-05-27 ENCOUNTER — TELEPHONE (OUTPATIENT)
Dept: PHYSICAL THERAPY | Facility: HOSPITAL | Age: 80
End: 2020-05-27

## 2020-05-27 NOTE — PROGRESS NOTES
[facility]    Diagnosis:   IgM MGUS    Treatment History: N/A    Visit Diagnosis:  Low grade B-cell lymphoma ([facility] [address]  (primary encounter diagnosis)     History of Present Illness: 80M with He follows with a PMH of osteoarthriti constipation. , Disp: 10 each, Rfl: 0  docusate sodium 100 MG Oral Cap, This is colace, a stool softener. Over-the-counter medication. Take twice daily while you are taking pain medications. , Disp: 60 capsule, Rfl: 0  acetaminophen 500 MG Oral Tab, Take 1 t 88 kg (194 lb) (05/28 0900)  BSA (Calculated - sq m): 2.04 sq meters (05/28 0900)  Pulse: 84 (05/28 0900)  BP: 139/71 (05/28 0900)  Temp: 97.3 °F (36.3 °C) (05/28 0900)  Do Not Use - Resp Rate: --  SpO2: 93 % (05/28 0900)     General: NAD, AOX3, using walk however, reactive conditions including changes related to autoimmune disease cannot be completely excluded. Clinical and radiographic correlation is suggested.   If clinically indicated, flow cytometry immunophenotyping studies on peripheral blood may be o

## 2020-05-28 ENCOUNTER — OFFICE VISIT (OUTPATIENT)
Dept: HEMATOLOGY/ONCOLOGY | Facility: HOSPITAL | Age: 80
End: 2020-05-28
Attending: INTERNAL MEDICINE
Payer: MEDICARE

## 2020-05-28 VITALS
DIASTOLIC BLOOD PRESSURE: 71 MMHG | RESPIRATION RATE: 16 BRPM | OXYGEN SATURATION: 93 % | BODY MASS INDEX: 28.73 KG/M2 | WEIGHT: 194 LBS | HEIGHT: 69.06 IN | HEART RATE: 84 BPM | TEMPERATURE: 97 F | SYSTOLIC BLOOD PRESSURE: 139 MMHG

## 2020-05-28 DIAGNOSIS — C85.10 LOW GRADE B-CELL LYMPHOMA (HCC): Primary | ICD-10-CM

## 2020-05-28 PROCEDURE — 99213 OFFICE O/P EST LOW 20 MIN: CPT | Performed by: INTERNAL MEDICINE

## 2020-06-01 ENCOUNTER — OFFICE VISIT (OUTPATIENT)
Dept: PHYSICAL THERAPY | Age: 80
End: 2020-06-01
Attending: ORTHOPAEDIC SURGERY
Payer: MEDICARE

## 2020-06-01 PROCEDURE — 97110 THERAPEUTIC EXERCISES: CPT

## 2020-06-01 NOTE — PROGRESS NOTES
Dx:  R  SHAZIA  3/11   Insurance (Authorized # of Visits):  No Auth Limit  18 POC       Authorizing Physician: Dr. Napoleon Eagle MD visit: 5/18/2020  Fall Risk: standard         Precautions: no ER, flex beyond 90, no adduction , no extension (anter strength/mobility and adjust HEP as needed.      Date: 4/20  Tx#: 7/10 Date: 4/23  Tx#: 8/10 Date: 4/27  Tx#: 9/10 Date: 4/29  Tx#: 10/10 Date: 5/6  Tx#: 11/18 Date: 5/13  Tx#: 12/18 Date: 5/20  Tx#: 13/18 Date: 6/1  Tx#: 14/18   Therex:  -Nu Step level 4, --R+L step ups on 2 inch step and airex 2x20 reps  - Standing knee flexion R+L LE 2x10 reps      Therex:  -Nu Step level 4, LE's reciprocal AROM x 6 min  -Hamstring stretch, therapist applied pressure, R+L 3x60 sec hold  - hook lying pelvic tilts 2x10 re bilateral forward/backward taps 2x20 reps  -Semi tandem on ariex R+L 2x30 seconds each   -Bilateral stance on green disc 3x 30 seconds   -Mini squats 2x10 reps   -Mini lunge, R/L, 2x10 reps  -Step up onto 6 inch step R+L 10 reps each   -Hamstring stretch w

## 2020-06-15 ENCOUNTER — TELEPHONE (OUTPATIENT)
Dept: PHYSICAL THERAPY | Age: 80
End: 2020-06-15

## 2020-06-16 ENCOUNTER — OFFICE VISIT (OUTPATIENT)
Dept: PHYSICAL THERAPY | Age: 80
End: 2020-06-16
Attending: ORTHOPAEDIC SURGERY
Payer: MEDICARE

## 2020-06-16 PROCEDURE — 97110 THERAPEUTIC EXERCISES: CPT

## 2020-06-16 NOTE — PROGRESS NOTES
Dx:  R  SHAZIA  3/11   Insurance (Authorized # of Visits):  No Auth Limit  18 POC       Authorizing Physician: Dr. Sammi Esposito Next MD visit: 5/18/2020  Fall Risk: standard         Precautions: no ER, flex beyond 90, no adduction , no extension (anterolateral) reciprocally with use of handrail MET 4/23/2020  · Pt will be independent and compliant with comprehensive HEP to maintain progress achieved in PT Met 6/16/2020    Plan: Discharge to comprehensive HEP.        Patient was advised of these findings, precautio on 2 inch step and airex 2x20 reps  -Bilateral stance on green disc 3x 30 seconds   -Semi tandem on ariex R+L 2x30 seconds each   -Bilateral heel raises 3x10 reps   - Standing knee flexion R+L LE 3x10 reps     Therex:  -Nu Step level 4, LE's reciprocal ONEYDA R+L 3x60 sec hold  -Seated pelvic tilts 2x10 reps   -Seated LAQ R+L 3x10 reps 4 lb weight, 2 second  -Bilateral heel raises 10 reps   -Tiltboard, bilateral forward/backward taps 2x20 reps  -Semi tandem on ariex R+L 2x30 seconds each   -Bilateral stance on

## 2020-10-09 ENCOUNTER — HOSPITAL ENCOUNTER (EMERGENCY)
Facility: HOSPITAL | Age: 80
Discharge: HOME | End: 2020-10-09
Attending: EMERGENCY MEDICINE
Payer: MEDICARE

## 2020-10-09 VITALS
SYSTOLIC BLOOD PRESSURE: 149 MMHG | TEMPERATURE: 98 F | RESPIRATION RATE: 14 BRPM | BODY MASS INDEX: 28 KG/M2 | WEIGHT: 192 LBS | OXYGEN SATURATION: 94 % | HEART RATE: 94 BPM | DIASTOLIC BLOOD PRESSURE: 90 MMHG

## 2020-10-09 DIAGNOSIS — S61.012A THUMB LACERATION, LEFT, INITIAL ENCOUNTER: Primary | ICD-10-CM

## 2020-10-09 PROCEDURE — 12001 RPR S/N/AX/GEN/TRNK 2.5CM/<: CPT

## 2020-10-09 PROCEDURE — 99282 EMERGENCY DEPT VISIT SF MDM: CPT

## 2020-10-09 PROCEDURE — 99283 EMERGENCY DEPT VISIT LOW MDM: CPT

## 2020-10-09 NOTE — ED PROVIDER NOTES
Patient Seen in: [facility] Emergency Department      History   Patient presents with:  Laceration/Abrasion    Stated Complaint: finger lac     HPI    Patient is an 79-year-old male presenting to the emergency department due to a laceration to his l distally throughout all 4 extremities. Sensations intact. HEENT: Supple neck without any meningismus or rigidity. Cardiovascular: Radial pulses 2+ bilaterally. Cap refills normal.  Lungs: Speaking full sentences without any distress or retractions.

## 2020-10-12 ENCOUNTER — ORDER TRANSCRIPTION (OUTPATIENT)
Dept: PHYSICAL THERAPY | Facility: HOSPITAL | Age: 80
End: 2020-10-12

## 2020-10-12 ENCOUNTER — OFFICE VISIT (OUTPATIENT)
Dept: PHYSICAL THERAPY | Facility: HOSPITAL | Age: 80
End: 2020-10-12
Attending: INTERNAL MEDICINE
Payer: MEDICARE

## 2020-10-12 DIAGNOSIS — M54.50 LOW BACK PAIN: Primary | ICD-10-CM

## 2020-10-12 DIAGNOSIS — M54.50 LOW BACK PAIN: ICD-10-CM

## 2020-10-12 PROCEDURE — 97110 THERAPEUTIC EXERCISES: CPT

## 2020-10-12 PROCEDURE — 97162 PT EVAL MOD COMPLEX 30 MIN: CPT

## 2020-10-12 NOTE — PROGRESS NOTES
SPINE EVALUATION:   Referring Physician: Dr. Monica Ann  Diagnosis: Back pain.        Date of Service: 10/12/2020     PATIENT SUMMARY   Barbie Alpers is a 79year old male who presents to therapy today with complaints of a several year history of lo necessary to address the above impairments and reach functional goals. Precautions:  None  OBJECTIVE:   Observation/Posture: decreased lumbar lordosis. Sit to standing:  Requires UE assist and is painful. Neuro Screen: intact B LEs.      Thoracolum making due to 3+ personal factors/comorbidities, 4+ body structures involved/activity limitations, and unstable symptoms including changing pain levels.   PLAN OF CARE:    Goals: (to be met in 10 visits)   Sit to/from standing without pain or difficulty,

## 2020-10-16 ENCOUNTER — HOSPITAL ENCOUNTER (EMERGENCY)
Facility: HOSPITAL | Age: 80
Discharge: HOME | End: 2020-10-16
Attending: EMERGENCY MEDICINE
Payer: MEDICARE

## 2020-10-16 VITALS
HEIGHT: 71 IN | RESPIRATION RATE: 16 BRPM | SYSTOLIC BLOOD PRESSURE: 127 MMHG | HEART RATE: 81 BPM | TEMPERATURE: 97 F | DIASTOLIC BLOOD PRESSURE: 83 MMHG | WEIGHT: 192 LBS | BODY MASS INDEX: 26.88 KG/M2 | OXYGEN SATURATION: 98 %

## 2020-10-16 DIAGNOSIS — Z48.02 ENCOUNTER FOR REMOVAL OF SUTURES: Primary | ICD-10-CM

## 2020-10-16 NOTE — ED NOTES
Pt aox4. RN informed patient of wound care, keep finger covered with bandage, s/s of infection, and follow up with pcp with patient. Pt verbalized understanding of dc instructions. Pt ambulated to ed exit with steady gait.

## 2020-10-16 NOTE — ED INITIAL ASSESSMENT (HPI)
Pt a0x4. Pt presents to ed from home. Pt to ed for suture removal to rt 1st digit. No redness, edema noted. Pt denies fever. Pt states sutures placed 1 weeks ago.

## 2020-10-16 NOTE — ED PROVIDER NOTES
Patient Seen in: [facility] Emergency Department      History   Patient presents with:  Ethel Perez    Stated Complaint: Sut removal from finger    HPI    Patient is an 18-year-old male presenting to the emergency department for suture anneliese sensation proximally and distally throughout all 4 extremities. HEENT: Supple neck without any meningismus or rigidity. Cardiovascular: Cap refill is normal.  Lungs: Speaking full sentences without any distress or retractions.    Extremities: No deformi

## 2020-10-21 ENCOUNTER — OFFICE VISIT (OUTPATIENT)
Dept: PHYSICAL THERAPY | Facility: HOSPITAL | Age: 80
End: 2020-10-21
Attending: INTERNAL MEDICINE
Payer: MEDICARE

## 2020-10-21 PROCEDURE — 97140 MANUAL THERAPY 1/> REGIONS: CPT

## 2020-10-21 PROCEDURE — 97110 THERAPEUTIC EXERCISES: CPT

## 2020-10-21 NOTE — PROGRESS NOTES
Dx: Low back pain. Insurance (Authorized # of Visits):  8           Authorizing Physician: Dr. Williams Joseph  Next MD visit: none scheduled/tbd. .. Fall Risk: standard         Precautions: s/p left and right THAs.         Subjective: deep dull low back p

## 2020-10-23 ENCOUNTER — OFFICE VISIT (OUTPATIENT)
Dept: PHYSICAL THERAPY | Facility: HOSPITAL | Age: 80
End: 2020-10-23
Attending: INTERNAL MEDICINE
Payer: MEDICARE

## 2020-10-23 PROCEDURE — 97110 THERAPEUTIC EXERCISES: CPT

## 2020-10-23 PROCEDURE — 97140 MANUAL THERAPY 1/> REGIONS: CPT

## 2020-10-23 NOTE — PROGRESS NOTES
Dx: Low back pain. Insurance (Authorized # of Visits):  8           Authorizing Physician: Dr. Alisia Velez  Next MD visit: none scheduled/tbd. .. Fall Risk: standard         Precautions: s/p left and right THAs.         Subjective: deep dull low back p stretching 30 seconds x 3 l/r. . Supine psoas stretching with PPT for 30 seconds x 3 each leg.          stm l-s area by PT while left side lying for 5 minutes. stm l-s area by PT while side lying for 5 minutes. Seated sciatic tensioners x 10 l/r. Domingo Lucas

## 2020-10-27 ENCOUNTER — APPOINTMENT (OUTPATIENT)
Dept: PHYSICAL THERAPY | Facility: HOSPITAL | Age: 80
End: 2020-10-27
Attending: INTERNAL MEDICINE
Payer: MEDICARE

## 2020-10-29 ENCOUNTER — APPOINTMENT (OUTPATIENT)
Dept: PHYSICAL THERAPY | Facility: HOSPITAL | Age: 80
End: 2020-10-29
Attending: INTERNAL MEDICINE
Payer: MEDICARE

## 2020-11-05 ENCOUNTER — OFFICE VISIT (OUTPATIENT)
Dept: PHYSICAL THERAPY | Facility: HOSPITAL | Age: 80
End: 2020-11-05
Attending: INTERNAL MEDICINE
Payer: MEDICARE

## 2020-11-05 PROCEDURE — 97110 THERAPEUTIC EXERCISES: CPT

## 2020-11-05 PROCEDURE — 97140 MANUAL THERAPY 1/> REGIONS: CPT

## 2020-11-05 NOTE — PROGRESS NOTES
Dx: Low back pain. Insurance (Authorized # of Visits):  8           Authorizing Physician: Dr. Cheryl Liriano  Next MD visit: none scheduled/tbd. .. Fall Risk: standard         Precautions: s/p left and right THAs.         Subjective: deep dull low back p 10.         stm left psoas muscle by PT.  stm left psoas muscle by PT. Not TTP anymore. Unilateral lumbar traction by PT l/r whil ehoo Unilateral lumbar traction by PT while hook lying for 3 minutes left and 3 minutes right.   For 3 minutes each leg

## 2020-11-09 ENCOUNTER — OFFICE VISIT (OUTPATIENT)
Dept: PHYSICAL THERAPY | Facility: HOSPITAL | Age: 80
End: 2020-11-09
Attending: INTERNAL MEDICINE
Payer: MEDICARE

## 2020-11-09 PROCEDURE — 97140 MANUAL THERAPY 1/> REGIONS: CPT

## 2020-11-09 PROCEDURE — 97110 THERAPEUTIC EXERCISES: CPT

## 2020-11-09 NOTE — PROGRESS NOTES
Dx: Low back pain. Insurance (Authorized # of Visits):  8           Authorizing Physician: Dr. Villegas Corporal  Next MD visit: none scheduled/tbd. .. Fall Risk: standard         Precautions: s/p left and right THAs.         Subjective: Deep constant low ba 10   Bridging with calves on SB 2 x 10. Short range 2 x 10. Short range bridges on SB       stm left psoas muscle by PT.  stm left psoas muscle by PT. Not TTP anymore. stm left psoas stm/tpr by PT while supine.        Unilateral lumbar traction by PT l

## 2020-11-11 ENCOUNTER — APPOINTMENT (OUTPATIENT)
Dept: PHYSICAL THERAPY | Facility: HOSPITAL | Age: 80
End: 2020-11-11
Attending: INTERNAL MEDICINE
Payer: MEDICARE

## 2020-11-13 ENCOUNTER — OFFICE VISIT (OUTPATIENT)
Dept: PHYSICAL THERAPY | Facility: HOSPITAL | Age: 80
End: 2020-11-13
Attending: INTERNAL MEDICINE
Payer: MEDICARE

## 2020-11-13 PROCEDURE — 97140 MANUAL THERAPY 1/> REGIONS: CPT

## 2020-11-13 PROCEDURE — 97110 THERAPEUTIC EXERCISES: CPT

## 2020-11-13 NOTE — PROGRESS NOTES
Dx: Low back pain. Insurance (Authorized # of Visits):  8           Authorizing Physician: Dr. Yomi Spence  Next MD visit: none scheduled; \"in the new year\". Fall Risk: standard         Precautions: s/p left and right THAs.         Subjective: Deep stm left psoas muscle by PT. Not TTP anymore. stm left psoas stm/tpr by PT while supine. Unilateral lumbar traction by PT l/r whil ehoo Unilateral lumbar traction by PT while hook lying for 3 minutes left and 3 minutes right.   For 3 minutes each le

## 2020-11-18 ENCOUNTER — OFFICE VISIT (OUTPATIENT)
Dept: PHYSICAL THERAPY | Facility: HOSPITAL | Age: 80
End: 2020-11-18
Attending: INTERNAL MEDICINE
Payer: MEDICARE

## 2020-11-18 PROCEDURE — 97110 THERAPEUTIC EXERCISES: CPT

## 2020-11-18 PROCEDURE — 97140 MANUAL THERAPY 1/> REGIONS: CPT

## 2020-11-18 NOTE — PROGRESS NOTES
Dx: Low back pain. Insurance (Authorized # of Visits):  8           Authorizing Physician: Dr. Harsh Elliott  Next MD visit: none scheduled; \"in the new year\". Fall Risk: standard         Precautions: s/p left and right THAs.         Subjective: Deep SB x 20. LTR with calves on SB x 15 l/r. .. LTR x 30. LTR with calves on \SB 3 x 10 l/r. Truong Cliche 3 x 10. X 30 l/r. Truong Cliche LTR with SB x 25 l/r. .    Bridging with calves on SB x 10   Bridging with calves on SB 2 x 10. Short range 2 x 10.    Short range bridges on l/r..  Seated trunk flexion with SB x 15. Standing gastroc stretch 30 seconds l/r.. Seated sciatic tensioners 2 x 10 l/r. .. Seated trunk flexion x 10 reps. .. Wide balance board a/p x 25. Calf stretch on wide board for 1 minuute.    4\" LSU x 12 l/r Se

## 2020-11-20 ENCOUNTER — OFFICE VISIT (OUTPATIENT)
Dept: PHYSICAL THERAPY | Facility: HOSPITAL | Age: 80
End: 2020-11-20
Attending: INTERNAL MEDICINE
Payer: MEDICARE

## 2020-11-20 PROCEDURE — 97110 THERAPEUTIC EXERCISES: CPT

## 2020-11-20 PROCEDURE — 97140 MANUAL THERAPY 1/> REGIONS: CPT

## 2020-11-20 NOTE — PROGRESS NOTES
Dx: Low back pain. Insurance (Authorized # of Visits):  8           Authorizing Physician: Dr. Williams Joseph  Next MD visit: none scheduled; \"in the new year\". Fall Risk: standard         Precautions: s/p left and right THAs.          Progress Summary blocks. Progress. Pt will report no pain/difficulty with LE dressing. Met. Pt will be independent with an upgraded HEP. Ongoing; being met. Plan: Continue skilled Physical Therapy 2 x/week or a total of 8 additional visits over a 90 day period. range bridging on SB.   2 x 10 with calves on SB. Bridging with calves on SB 2 x 10.   stm left psoas muscle by PT.  stm left psoas muscle by PT. Not TTP anymore. stm left psoas stm/tpr by PT while supine.    Supine stm to left psoas muscle by PT for 3- area by PT while left side lying. Seated sciatic tensioners x 10 l/r. . X 10 each leg. X 10 l/r. Florentino Knock Seated trunk flexion with SB x 15. Standing gastroc stretch 30 seconds l/r.. Seated sciatic tensioners 2 x 10 l/r. .. Seated trunk flexion x 10 reps. ..

## 2020-11-30 ENCOUNTER — OFFICE VISIT (OUTPATIENT)
Dept: PHYSICAL THERAPY | Facility: HOSPITAL | Age: 80
End: 2020-11-30
Attending: INTERNAL MEDICINE
Payer: MEDICARE

## 2020-11-30 PROCEDURE — 97110 THERAPEUTIC EXERCISES: CPT

## 2020-11-30 PROCEDURE — 97140 MANUAL THERAPY 1/> REGIONS: CPT

## 2020-11-30 NOTE — PROGRESS NOTES
Dx: Low back pain. Insurance (Authorized # of Visits):  4 additional visits were approved. Authorizing Physician: Dr. Williams Joseph  Next MD visit: none scheduled; \"in the new year\".   Fall Risk: standard         Precautions: s/p left and right hip and TL ROM, LE/core strengthening, manual therapy prn, education, and a functional progression as tolerated and as appropriate.         Date: 10/21/2020  TX#: 2/8 Date:    10/23/20             TX#: 3/8 Date:   11/5/20              TX#: 4/8 Date:    11/9 by PT.  Unilateral lumbar traction by PT l/r while hook lying for 3 minutes each. HSS by PT while hook lying for 1 minute each leg. For 3 minutes each leg. HSS by PT while hook lying for 1 minute each leg.   Unilateral lumbar traction by PT while ho 1 minuute. 4\" LSU x 12 l/r Seated sciatic tensioners 2 x 10 l/r. Estuardoe Manifold Seated trunk flexion x 10. Wide balance board a/p x 25.   6\" LSU x 12 each. Airex:  DLS EC for 2 minutes. Big marching x 20 l/r with light HHA. PSD x 10 l/r with light HHA.    L

## 2020-12-07 ENCOUNTER — OFFICE VISIT (OUTPATIENT)
Dept: PHYSICAL THERAPY | Facility: HOSPITAL | Age: 80
End: 2020-12-07
Attending: INTERNAL MEDICINE
Payer: MEDICARE

## 2020-12-07 PROCEDURE — 97110 THERAPEUTIC EXERCISES: CPT

## 2020-12-07 PROCEDURE — 97140 MANUAL THERAPY 1/> REGIONS: CPT

## 2020-12-07 NOTE — PROGRESS NOTES
Dx: Low back pain. Insurance (Authorized # of Visits):  4 additional visits were approved. Authorizing Physician: Dr. Fatuma Carrillo  Next MD visit: none scheduled; \"in the new year\".   Fall Risk: standard         Precautions: s/p left and right strengthening, manual therapy prn, education, and a functional progression as tolerated and as appropriate.         Date: 10/21/2020  TX#: 2/8 Date:    10/23/20             TX#: 3/8 Date:   11/5/20              TX#: 4/8 Date:    11/9/20             TX#: 5/8 For 3 minutes each leg. X 3 minutes each leg. HSS by PT while hook lying for 1 minute each leg by PT. Unilateral lumbar traction by PT l/r while hook lying for 3 minutes each. HSS by PT while hook lying for 1 minute each leg.   For 3 minutes each l left side lying. X.     Seated sciatic tensioners x 10 l/r. . X 10 each leg. X 10 l/r. Nancy Spar Seated trunk flexion with SB x 15. Standing gastroc stretch 30 seconds l/r.. Seated sciatic tensioners 2 x 10 l/r. .. Seated trunk flexion x 10 reps. ..   Isha nicolas

## 2020-12-10 ENCOUNTER — OFFICE VISIT (OUTPATIENT)
Dept: PHYSICAL THERAPY | Facility: HOSPITAL | Age: 80
End: 2020-12-10
Attending: INTERNAL MEDICINE
Payer: MEDICARE

## 2020-12-10 PROCEDURE — 97110 THERAPEUTIC EXERCISES: CPT

## 2020-12-10 PROCEDURE — 97140 MANUAL THERAPY 1/> REGIONS: CPT

## 2020-12-10 NOTE — PROGRESS NOTES
Dx: Low back pain. Insurance (Authorized # of Visits):  4 additional visits were approved. Authorizing Physician: Dr. Kate Chaves  Next MD visit: none scheduled; \"in the new year\".   Fall Risk: standard         Precautions: s/p left and right functional progression as tolerated and as appropriate.         Date: 10/21/2020  TX#: 2/8 Date:    10/23/20             TX#: 3/8 Date:   11/5/20              TX#: 4/8 Date:    11/9/20             TX#: 5/8 Date: 11/13/20  Tx#: 6/8 11/18/20  #7/8 11/20/20  # Unilateral lumbar traction by PT while hook lying for 3 minutes left and 3 minutes right. For 3 minutes each leg. For 3 minutes each leg. X 3 minutes each leg. HSS by PT while hook lying for 1 minute each leg by PT.   Unilateral lumbar traction by PT minutes. stm l-s area by PT for 5 minutes while left side lying. stm l-s area by PT while left side lying for 4-5 minutes. X. stm l-s area by PT while left side lying for 4-5 minutes. stm l-s area by PT while left side lying.   stm l-s area by PT while

## 2020-12-15 ENCOUNTER — OFFICE VISIT (OUTPATIENT)
Dept: PHYSICAL THERAPY | Facility: HOSPITAL | Age: 80
End: 2020-12-15
Attending: INTERNAL MEDICINE
Payer: MEDICARE

## 2020-12-15 PROCEDURE — 97140 MANUAL THERAPY 1/> REGIONS: CPT

## 2020-12-15 PROCEDURE — 97110 THERAPEUTIC EXERCISES: CPT

## 2020-12-15 NOTE — PROGRESS NOTES
Dx: Low back pain. Insurance (Authorized # of Visits):  3 appointments 12/15/20 -       Authorizing Physician: Dr. Ross Zhang  Next MD visit: none scheduled; \"in the new year\". Fall Risk: standard         Precautions: s/p left and right THAs. 10/21/2020  TX#: 2/8 Date:    10/23/20             TX#: 3/8 Date:   11/5/20              TX#: 4/8 Date:    11/9/20             TX#: 5/8 Date: 11/13/20  Tx#: 6/8 11/18/20  #7/8 11/20/20  #8/8 11/30/20  #1/4 add'l rxs. .. 12/7/20  #2/4 add'l. .. 12/10/20  #3/4 psoas muscle by PT. Unilateral lumbar traction by PT l/r whil ehoo Unilateral lumbar traction by PT while hook lying for 3 minutes left and 3 minutes right. For 3 minutes each leg. For 3 minutes each leg. X 3 minutes each leg.      HSS by PT while h For 1 minute each leg. For 1 minute each leg.      stm l-s area by PT while left side lying for 5 minutes. stm l-s area by PT while side lying for 5 minutes. stm l-s area by PT for 5 minutes while left side lying.    stm l-s area by PT while left side ly arm lifts x 10 reps. .  Trunk rotation x 10 l/r. Arch Ivan PSD x 10 l/r. .. Marching x 15 l/r. Arch Ivan 6\" LSU x 15 l/r. .. HE2P: LTR, HA with green bands, SKTC, bridging, seated TL flexion, and seated sciatic tensioners. Charges: Deloria Meme.        Total Timed Tr

## 2020-12-17 ENCOUNTER — OFFICE VISIT (OUTPATIENT)
Dept: PHYSICAL THERAPY | Facility: HOSPITAL | Age: 80
End: 2020-12-17
Attending: INTERNAL MEDICINE
Payer: MEDICARE

## 2020-12-17 PROCEDURE — 97140 MANUAL THERAPY 1/> REGIONS: CPT

## 2020-12-17 PROCEDURE — 97110 THERAPEUTIC EXERCISES: CPT

## 2020-12-17 NOTE — PROGRESS NOTES
Dx: Low back pain. Insurance (Authorized # of Visits):  3 appointments 12/15/20 -       Authorizing Physician: Dr. Alisia Velez  Next MD visit: none scheduled; \"in the new year\". Fall Risk: standard         Precautions: s/p left and right THAs. 10/23/20             TX#: 3/8 Date:   11/5/20              TX#: 4/8 Date:    11/9/20             TX#: 5/8 Date: 11/13/20  Tx#: 6/8 11/18/20  #7/8 11/20/20  #8/8 11/30/20  #1/4 add'l rxs. .. 12/7/20  #2/4 add'l. .. 12/10/20  #3/4 add'l. .. 12/15/20  #1/3 add'l stm to left psoas muscle for 3--4 minutes while supine. Supine stm left psoas by PT for 2-3 minutes. Supine stm left psoas m by PT. Supine left psoas stm by PT. Supine stm left psoas muscle by PT.  stm left psoas muscle for 1-2 minutes.      Unilateral seconds x 2 each leg. X. 30 seconds x 2 each leg. Supine left psoas stretching with PT guidance for 30 seconds x 2. Supine left and right psoas stretching with PT guidance for 30 seconds x 2 each leg.   Supine l/r psoas stretching with PT guidance for 30 hands, OH lifting x 10. Trunk rotation x 10 l/r. Chirag Roughen PSD x 10 l/r. .  Marching x 10 l/r. Chirag Roughen 6\" LSU x 15 l/r with HHA prn. . X 10 l/r. .    Wide balance board a/p x 30. Airex:    DLS with B arm lifts x 10. Trunk rotation x 10 l/r. Chirag Roughen PSD x 10 l/r. .  Mar

## 2020-12-21 ENCOUNTER — OFFICE VISIT (OUTPATIENT)
Dept: PHYSICAL THERAPY | Facility: HOSPITAL | Age: 80
End: 2020-12-21
Attending: INTERNAL MEDICINE
Payer: MEDICARE

## 2020-12-21 PROCEDURE — 97110 THERAPEUTIC EXERCISES: CPT

## 2020-12-21 PROCEDURE — 97140 MANUAL THERAPY 1/> REGIONS: CPT

## 2020-12-21 NOTE — PROGRESS NOTES
Dx: Low back pain. Insurance (Authorized # of Visits):  3 appointments 12/15/20 -       Authorizing Physician: Dr. Cheryl Liriano  Next MD visit: none scheduled; \"in the new year\". Fall Risk: standard         Precautions: s/p left and right THAs. TX#: 3/8 Date:   11/5/20              TX#: 4/8 Date:    11/9/20             TX#: 5/8 Date: 11/13/20  Tx#: 6/8 11/18/20  #7/8 11/20/20  #8/8 11/30/20  #1/4 add'l rxs. .. 12/7/20  #2/4 add'l. .. 12/10/20  #3/4 add'l. .. 12/15/20  #1/3 add'l. .. 12/17/20  #2/3 psoas stm/tpr by PT while supine. Supine stm to left psoas muscle by PT for 3-4 minutes. stm to left psoas muscle for 3--4 minutes while supine. Supine stm left psoas by PT for 2-3 minutes. Supine stm left psoas m by PT.   Supine left psoas stm by PT. reps.. X 30 reps. . X 30 reps. . HEP. Supine psoas stretching 30 seconds x 3 l/r. . Supine psoas stretching with PPT for 30 seconds x 3 each leg. Supine psoas stretching for 30 seconds x 2 each leg. X. 30 seconds x 2 each leg.   Supine left psoas stretch l/r. Suen Maurizio 6\" LSU x 15 l/r. .. Airex:   DL standing for 1-2 minutes. Marching x 15 l/r. Suen Maurizio PSD x 10 l/r. .. Wide balance board a/p x 25. Seated sciatic tensioners 2 x 10 l/r. .    Wide balance board a/p x 30.    Airex:    Holding ball with 2 hands, OH lif

## 2020-12-23 ENCOUNTER — APPOINTMENT (OUTPATIENT)
Dept: PHYSICAL THERAPY | Facility: HOSPITAL | Age: 80
End: 2020-12-23
Attending: INTERNAL MEDICINE
Payer: MEDICARE

## 2020-12-28 ENCOUNTER — APPOINTMENT (OUTPATIENT)
Dept: PHYSICAL THERAPY | Facility: HOSPITAL | Age: 80
End: 2020-12-28
Attending: INTERNAL MEDICINE
Payer: MEDICARE

## 2021-12-08 NOTE — PROGRESS NOTES
Encounter addended by: Josephine Avelar, PT on: 12/8/2021 8:55 AM   Actions taken: Clinical Note Signed, Episode resolved Post Op Day 1 Ortho Note    Status Post Nerve Block:  Type of Nerve Block: Right fascia iliaca  Single Injection Nerve Block    Post op review: No evidence of immediate block related complications, No paresthesia noted, Able to plantar flex foot, Able to d

## (undated) DEVICE — DRAPE,U/SHT,SPLIT,FILM,60X84,STERILE: Brand: MEDLINE

## (undated) DEVICE — SUTURE VICRYL 1 OS-6

## (undated) DEVICE — 2T11 #2 PDO 36 X 36: Brand: 2T11 #2 PDO 36 X 36

## (undated) DEVICE — SUTURE VICRYL 2-0 CP-1

## (undated) DEVICE — STERILE POLYISOPRENE POWDER-FREE SURGICAL GLOVES: Brand: PROTEXIS

## (undated) DEVICE — SPECIMEN CONTAINER,POSITIVE SEAL INDICATOR, OR PACKAGED: Brand: PRECISION

## (undated) DEVICE — DRESSING AQUACEL AG 3.5 X 10

## (undated) DEVICE — 450 ML BOTTLE OF 0.05% CHLORHEXIDINE GLUCONATE IN 99.95% STERILE WATER FOR IRRIGATION, USP AND APPLICATOR.: Brand: IRRISEPT ANTIMICROBIAL WOUND LAVAGE

## (undated) DEVICE — STERILE HOOK LOCK LATEX FREE ELASTIC BANDAGE 6INX5YD: Brand: HOOK LOCK™

## (undated) DEVICE — BLADE ELECTRODE: Brand: EDGE

## (undated) DEVICE — GOWN SURG AERO CHROME XXL

## (undated) DEVICE — Device: Brand: STABLECUT®

## (undated) DEVICE — PILLOW FX 56X38X15CM MED HIP

## (undated) DEVICE — CHLORAPREP 26ML APPLICATOR

## (undated) DEVICE — HOOD, PEEL-AWAY: Brand: FLYTE

## (undated) DEVICE — BIPOLAR SEALER 23-112-1 AQM 6.0: Brand: AQUAMANTYS™

## (undated) DEVICE — KENDALL SCD EXPRESS SLEEVES, KNEE LENGTH, MEDIUM: Brand: KENDALL SCD

## (undated) DEVICE — TOTAL HIP CDS: Brand: MEDLINE INDUSTRIES, INC.

## (undated) DEVICE — DECANTER BAG 9": Brand: MEDLINE INDUSTRIES, INC.

## (undated) NOTE — LETTER
Patient Name: Chichi Donaldson  YOB: 1940          MRN number:  [id number]  Date:  6/16/2020  Referring Physician:  Kavita Lagos MD    Discharge Summary  Number of Visits Attended 15 in Physical Therapy    Assessment: Good progress and has agreed to actively participate in planning and for this course of care. Thank you for your referral. If you have any questions, please contact me at Dept: [phone].     Sincerely,  Electronically signed by therapist: Aspen Bonilla PT, DPT

## (undated) NOTE — LETTER
[facility] Department  Phone: [phone]  OUTSIDE TESTING RESULT REQUEST      TO:   Dr. Fatuma Carrillo Today's Date: 2/18/20    FAX #: [phone]     IMPORTANT: FOR YOUR IMMEDIATE ATTENTION  Please FAX all test results listed below to: 61

## (undated) NOTE — LETTER
Patient Name: Abilio Crowell  YOB: 1940          MRN :  [id number]  Date:  11/20/2020  Referring Physician:  Fermin Means    Progress Summary  Pt has attended 8 visits in Physical Therapy.      Subjective: Deep constant low back rakan Pt will be independent with an upgraded HEP. Ongoing; being met. Plan: Continue skilled Physical Therapy 2 x/week or a total of 8 additional visits over a 90 day period.    Treatment will include:  hip and TL ROM, LE/core strengthening, manual therap